# Patient Record
Sex: MALE | Race: WHITE
[De-identification: names, ages, dates, MRNs, and addresses within clinical notes are randomized per-mention and may not be internally consistent; named-entity substitution may affect disease eponyms.]

---

## 2020-10-27 ENCOUNTER — HOSPITAL ENCOUNTER (INPATIENT)
Dept: HOSPITAL 62 - ER | Age: 60
LOS: 6 days | Discharge: HOME | DRG: 179 | End: 2020-11-02
Attending: FAMILY MEDICINE | Admitting: INTERNAL MEDICINE
Payer: SELF-PAY

## 2020-10-27 DIAGNOSIS — F12.90: ICD-10-CM

## 2020-10-27 DIAGNOSIS — F17.210: ICD-10-CM

## 2020-10-27 DIAGNOSIS — F19.10: ICD-10-CM

## 2020-10-27 DIAGNOSIS — J85.0: Primary | ICD-10-CM

## 2020-10-27 DIAGNOSIS — F10.20: ICD-10-CM

## 2020-10-27 LAB
ADD MANUAL DIFF: NO
ALBUMIN SERPL-MCNC: 3.4 G/DL (ref 3.5–5)
ALP SERPL-CCNC: 92 U/L (ref 38–126)
ANION GAP SERPL CALC-SCNC: 13 MMOL/L (ref 5–19)
AST SERPL-CCNC: 48 U/L (ref 17–59)
BASOPHILS # BLD AUTO: 0.1 10^3/UL (ref 0–0.2)
BASOPHILS NFR BLD AUTO: 0.3 % (ref 0–2)
BILIRUB DIRECT SERPL-MCNC: 0.3 MG/DL (ref 0–0.4)
BILIRUB SERPL-MCNC: 0.8 MG/DL (ref 0.2–1.3)
BUN SERPL-MCNC: 21 MG/DL (ref 7–20)
CALCIUM: 9.4 MG/DL (ref 8.4–10.2)
CHLORIDE SERPL-SCNC: 93 MMOL/L (ref 98–107)
CO2 SERPL-SCNC: 28 MMOL/L (ref 22–30)
EOSINOPHIL # BLD AUTO: 0.1 10^3/UL (ref 0–0.6)
EOSINOPHIL NFR BLD AUTO: 0.4 % (ref 0–6)
ERYTHROCYTE [DISTWIDTH] IN BLOOD BY AUTOMATED COUNT: 13 % (ref 11.5–14)
ETHANOL SERPL-MCNC: < 10 MG/DL
GLUCOSE SERPL-MCNC: 100 MG/DL (ref 75–110)
HCT VFR BLD CALC: 40 % (ref 37.9–51)
HGB BLD-MCNC: 13.9 G/DL (ref 13.5–17)
LYMPHOCYTES # BLD AUTO: 1.7 10^3/UL (ref 0.5–4.7)
LYMPHOCYTES NFR BLD AUTO: 8.5 % (ref 13–45)
MCH RBC QN AUTO: 32 PG (ref 27–33.4)
MCHC RBC AUTO-ENTMCNC: 34.8 G/DL (ref 32–36)
MCV RBC AUTO: 92 FL (ref 80–97)
MONOCYTES # BLD AUTO: 1.1 10^3/UL (ref 0.1–1.4)
MONOCYTES NFR BLD AUTO: 5.4 % (ref 3–13)
NEUTROPHILS # BLD AUTO: 17 10^3/UL (ref 1.7–8.2)
NEUTS SEG NFR BLD AUTO: 85.4 % (ref 42–78)
PLATELET # BLD: 488 10^3/UL (ref 150–450)
POTASSIUM SERPL-SCNC: 4.9 MMOL/L (ref 3.6–5)
PROT SERPL-MCNC: 7.5 G/DL (ref 6.3–8.2)
RBC # BLD AUTO: 4.35 10^6/UL (ref 4.35–5.55)
TOTAL CELLS COUNTED % (AUTO): 100 %
WBC # BLD AUTO: 19.9 10^3/UL (ref 4–10.5)

## 2020-10-27 PROCEDURE — 80307 DRUG TEST PRSMV CHEM ANLYZR: CPT

## 2020-10-27 PROCEDURE — 85025 COMPLETE CBC W/AUTO DIFF WBC: CPT

## 2020-10-27 PROCEDURE — 83735 ASSAY OF MAGNESIUM: CPT

## 2020-10-27 PROCEDURE — 86480 TB TEST CELL IMMUN MEASURE: CPT

## 2020-10-27 PROCEDURE — 93005 ELECTROCARDIOGRAM TRACING: CPT

## 2020-10-27 PROCEDURE — 80061 LIPID PANEL: CPT

## 2020-10-27 PROCEDURE — 71260 CT THORAX DX C+: CPT

## 2020-10-27 PROCEDURE — 87205 SMEAR GRAM STAIN: CPT

## 2020-10-27 PROCEDURE — 87206 SMEAR FLUORESCENT/ACID STAI: CPT

## 2020-10-27 PROCEDURE — 87077 CULTURE AEROBIC IDENTIFY: CPT

## 2020-10-27 PROCEDURE — 80048 BASIC METABOLIC PNL TOTAL CA: CPT

## 2020-10-27 PROCEDURE — 80053 COMPREHEN METABOLIC PANEL: CPT

## 2020-10-27 PROCEDURE — 87040 BLOOD CULTURE FOR BACTERIA: CPT

## 2020-10-27 PROCEDURE — 87070 CULTURE OTHR SPECIMN AEROBIC: CPT

## 2020-10-27 PROCEDURE — 96365 THER/PROPH/DIAG IV INF INIT: CPT

## 2020-10-27 PROCEDURE — 71045 X-RAY EXAM CHEST 1 VIEW: CPT

## 2020-10-27 PROCEDURE — 86701 HIV-1ANTIBODY: CPT

## 2020-10-27 PROCEDURE — 99285 EMERGENCY DEPT VISIT HI MDM: CPT

## 2020-10-27 PROCEDURE — 87116 MYCOBACTERIA CULTURE: CPT

## 2020-10-27 PROCEDURE — 84100 ASSAY OF PHOSPHORUS: CPT

## 2020-10-27 PROCEDURE — 36415 COLL VENOUS BLD VENIPUNCTURE: CPT

## 2020-10-27 PROCEDURE — 87015 SPECIMEN INFECT AGNT CONCNTJ: CPT

## 2020-10-27 PROCEDURE — 93010 ELECTROCARDIOGRAM REPORT: CPT

## 2020-10-27 RX ADMIN — HEPARIN SODIUM SCH UNIT: 5000 INJECTION, SOLUTION INTRAVENOUS; SUBCUTANEOUS at 23:22

## 2020-10-27 RX ADMIN — Medication SCH MG: at 23:22

## 2020-10-27 RX ADMIN — NICOTINE SCH: 21 PATCH, EXTENDED RELEASE TOPICAL at 23:22

## 2020-10-27 RX ADMIN — ACETAMINOPHEN PRN MG: 325 TABLET ORAL at 21:36

## 2020-10-27 RX ADMIN — CEFEPIME SCH MLS/HR: 2 INJECTION, POWDER, FOR SOLUTION INTRAMUSCULAR; INTRAVENOUS at 23:23

## 2020-10-27 NOTE — RADIOLOGY REPORT (SQ)
EXAM DESCRIPTION:  CHEST SINGLE VIEW



IMAGES COMPLETED DATE/TIME:  10/27/2020 11:18 am



REASON FOR STUDY:  Dyspnea



COMPARISON:  None.



EXAM PARAMETERS:  NUMBER OF VIEWS: One view.

TECHNIQUE: Single frontal radiographic view of the chest acquired.

RADIATION DOSE: NA

LIMITATIONS: None.



FINDINGS:  LUNGS AND PLEURA: Dense consolidation in the right upper lobe.  Otherwise clear.  No pleur
al effusion.  No pneumothorax.

MEDIASTINUM AND HILAR STRUCTURES: No masses.  Contour normal.

HEART AND VASCULAR STRUCTURES: Heart normal in size.  Normal vasculature.

BONES: No acute findings.  Degenerative changes in the spine.

HARDWARE: None in the chest.

OTHER: No other significant finding.



IMPRESSION:  DENSE CONSOLIDATION IN THE RIGHT UPPER LOBE LIKELY DUE TO PNEUMONIA.  UNDERLYING MASS CA
NNOT BE EXCLUDED.



TECHNICAL DOCUMENTATION:  JOB ID:  [id number]

 2011 [employer]- All Rights Reserved



Reading location - IP/workstation name: [email or web address]

## 2020-10-27 NOTE — ER DOCUMENT REPORT
ED General





- General


Chief Complaint: Flu Symptoms


Stated Complaint: BACK PAIN/SHORTNESS OF BREATH


Time Seen by Provider: 10/27/20 10:06





- HPI


Notes: 





Chief complaint: Back pain cough





History of present illness: 60-year-old male 1 pack/day cigarette smoker for 

many years presents now with 1 month history of progressively worsening back 

pain seated with 15 pound weight loss and cough productive of some sputum.  He 

denies hemoptysis.  He denies any prior history of respiratory problems.  He 

specifically denies any known history of TB or exposure to TB that he is aware 

of.  Has not seen a doctor in many years and is not currently taking any 

medications.  He has no known allergies.  Patient says he drinks 4 to 6 beers 

per day.  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

6 months ago due to chronic musculoskeletal pain.  Currently lives alone.





- Related Data


Allergies/Adverse Reactions: 


                                        





No Known Allergies Allergy (Verified 10/27/20 10:51)


   











Past Medical History





- General


Information source: Patient





- Social History


Smoking Status: Current Every Day Smoker


Family History: Reviewed & Not Pertinent





- Past Medical History


Cardiac Medical History: Reports: Hx Hypertension


Surgical Hx: Negative





Review of Systems





- Review of Systems


Notes: 





Constitutional: Negative for fever.  Weight loss as per HPI.


HENT: Negative for sore throat.


Eyes: Negative for visual changes.


Cardiovascular: Negative for chest pain.


Respiratory: As per HPI.


Gastrointestinal: Negative for abdominal pain, vomiting or diarrhea.


Genitourinary: Negative for dysuria.


Musculoskeletal: As per HPI.


Skin: Negative for rash.


Neurological: Negative for headaches, weakness or numbness.





10 point ROS negative except as marked above and in HPI.








Physical Exam





- Vital signs


Vitals: 


                                        











Temp Pulse Resp BP Pulse Ox


 


 98.1 F   119 H  18   128/80 H  100 


 


 10/27/20 09:21  10/27/20 09:21  10/27/20 09:21  10/27/20 09:21  10/27/20 09:21














Course





- Re-evaluation


Re-evalutation: 





10/27/20 15:12


Patient has what appears to be a dense pneumonic infiltrate versus mass on plain

film of the chest.  We obtained a CT of the chest with IV contrast and this 

suggests a necrotizing pneumonia with central cavitation.  20,000 white count 

present.  Patient is mildly dyspneic at rest but has normal O2 saturation by 

pulse oximeter at this time.  Blood cultures have been drawn.  Patient has 

received IV Rocephin and azithromycin.  We spoke with Dr. Collier from pulmonary 

who agrees to see the patient for consultation possible bronchoscopy if patient 

can be admitted by the hospitalist service.





Patient has been accepted for admission by hospitalist service.





Findings, clinical impression and plan of treatment have been discussed with 

patient/family.  Understanding of current findings and recommendations has been 

acknowledged by them and there is agreement regarding disposition and follow-up.





- Vital Signs


Vital signs: 


                                        











Temp Pulse Resp BP Pulse Ox


 


 98.1 F   119 H  33 H  141/95 H  84 L


 


 10/27/20 09:21  10/27/20 09:21  10/27/20 14:06  10/27/20 13:03  10/27/20 14:06














- Laboratory


Result Diagrams: 


                                 10/27/20 12:42





                                 10/27/20 12:42


Laboratory results interpreted by me: 


                                        











  10/27/20 10/27/20





  12:42 12:42


 


WBC  19.9 H 


 


Plt Count  488 H 


 


Lymph % (Auto)  8.5 L 


 


Absolute Neuts (auto)  17.0 H 


 


Seg Neutrophils %  85.4 H 


 


Sodium   133.8 L


 


Chloride   93 L


 


BUN   21 H


 


Albumin   3.4 L














- EKG Interpretation by Me


Additional EKG results interpreted by me: 





10/27/20 11:17


Twelve-lead EKG reviewed by me contemporaneously: 1000 hours


Indication for study: Back pain


Rhythm: Normal sinus


Rate: 96


Intervals: Normal


QRS axis: +54 degrees


ST/T wave changes: None


Right atrial abnormality present


Comparison with prior tracing: None





Interpretation: Right atrial abnormality





Discharge





- Discharge


Clinical Impression: 


 Necrotizing pneumonia right upper lobe





Condition: Good


Disposition: ADMITTED AS INPATIENT


Admitting Provider: Ban (Hospitalist)


Unit Admitted: Medical Floor

## 2020-10-27 NOTE — PDOC H&P
History of Present Illness


Admission Date/PCP: 


  10/27/20 16:28





  





History of Present Illness: 


SALVADOR FREGOSO III is a 60 year old male with no significant past medical 

history who presents with a 1 week history of progressive chills right-sided 

chest pain shortness of breath.  Patient also states he has been losing weight 

with approximately 15 to 20 pounds lost in the past month.  Patient states is 

never had this sensation before.  He believes it is related to inhaling 

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  He states multiple 

other workers were also ill from this.  He is a current smoker and drinks 4-6 

beers per day last drink was yesterday.  He states he is never gone through 

withdrawal.  He also smokes marijuana intermittently.  He denies any fevers or 

productive cough.  He denies any COVID-19 exposures and denies any history of 

tuberculosis.  He denies any history of GERD or aspiration.  Pulmonology was 

consulted on admission for possible bronchoscopy.  CT scan chest showed 

necrotizing pneumonia in right upper lobe.








Past Medical History


Cardiac Medical History: Reports: Hypertension


EENT Medical History: Reports: Cataracts


Psychiatric Medical History: Reports: Alcohol Dependency, Substance Abuse, 

Tobacco Dependency





Past Surgical History


Past Surgical History: Reports: Other - Cataract





Social History


Information Source: Patient, Emergency Med Personnel


Lives with: Alone


Smoking Status: Current Every Day Smoker


Frequency of Alcohol Use: Heavy


Hx Recreational Drug Use: Yes


Drugs: Marijuana


Hx Prescription Drug Abuse: No





- Advance Directive


Resuscitation Status: Full Code


Surrogate healthcare decision maker:: 


Admitting diagnosis: Necrotizing pneumonia





All aspects of code status discussed with patient/POA including cardioversion, 

chest compressions, and intubation and the patient/POA indicated they wish to be

full code





MPOA is designated as: Anna Marie Meyer





Time spent: Greater than 16 minutes








Family History


Family History: Reviewed & Not Pertinent


Parental Family History Reviewed: Yes


Children Family History Reviewed: Yes


Sibling(s) Family History Reviewed.: Yes





Medication/Allergy


Home Medications: 








No Home Medications  10/27/20 








Allergies/Adverse Reactions: 


                                        





No Known Allergies Allergy (Verified 10/27/20 10:51)


   











Review of Systems


All systems: reviewed and no additional remarkable complaints except as stated -

Review of systems per HPI otherwise negative





Physical Exam


Vital Signs: 


                                        











Temp Pulse Resp BP Pulse Ox


 


 99.8 F   100   17   147/102 H  98 


 


 10/27/20 18:31  10/27/20 18:31  10/27/20 18:31  10/27/20 18:31  10/27/20 18:31








                                 Intake & Output











 10/26/20 10/27/20 10/28/20





 06:59 06:59 06:59


 


Weight   54.2 kg











Exam: 





General appearance: PRESENT: no acute distress, thin


Head exam: PRESENT: atraumatic, normocephalic


Eye exam: PRESENT: conjunctiva pink.  ABSENT: scleral icterus


Mouth exam: PRESENT: moist


Respiratory exam: PRESENT: Faint rales right upper lobe ABSENT: rhonchi, wheezes


Cardiovascular exam: PRESENT: RRR.  ABSENT: diastolic murmur, rubs, systolic 

murmur


GI/Abdominal exam: PRESENT: normal bowel sounds, soft.  ABSENT: distended, 

guarding, mass, organolmegaly, rebound, tenderness


Neurological exam: PRESENT: alert, awake, oriented to person, oriented to place,

oriented to time, oriented to situation


Psychiatric exam: PRESENT: appropriate affect, normal mood


Skin exam: PRESENT: dry, intact, warm





Results


Laboratory Results: 


                                        





                                 10/27/20 12:42 





                                 10/27/20 12:42 





                                        











  10/27/20 10/27/20





  12:42 12:42


 


WBC  19.9 H 


 


RBC  4.35 


 


Hgb  13.9 


 


Hct  40.0 


 


MCV  92 


 


MCH  32.0 


 


MCHC  34.8 


 


RDW  13.0 


 


Plt Count  488 H 


 


Seg Neutrophils %  85.4 H 


 


Sodium   133.8 L


 


Potassium   4.9


 


Chloride   93 L


 


Carbon Dioxide   28


 


Anion Gap   13


 


BUN   21 H


 


Creatinine   0.65


 


Est GFR (African Amer)   > 60


 


Glucose   100


 


Calcium   9.4


 


Total Bilirubin   0.8


 


AST   48


 


Alkaline Phosphatase   92


 


Total Protein   7.5


 


Albumin   3.4 L











Impressions: 


                                        





Chest X-Ray  10/27/20 10:44


IMPRESSION:  DENSE CONSOLIDATION IN THE RIGHT UPPER LOBE LIKELY DUE TO 

PNEUMONIA.  UNDERLYING MASS CANNOT BE EXCLUDED.


 








Chest CT  10/27/20 11:12


IMPRESSION:  Necrotizing right upper lobe pneumonia with central cavity and 

fluid


Probable 1 cm scar in the right lower lobe adjacent to old healed rib fractures


 














Assessment and Plan





- Diagnosis


(1) Necrotizing pneumonia


Is this a current diagnosis for this admission?: Yes   


Plan: 





Approximately 1 week of progressive right-sided chest pain and chills, patient 

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


Pulmonology consulted for possible bronchoscopy


Cefepime/azithromycin for broad coverage


Rule out TB: QuantiFERON gold and AF[employer] x3


Sputum culture


Blood culture


Duo nebs as needed


Pulmonary hygiene


HIV ordered








(2) Cavitary lesion of lung


Is this a current diagnosis for this admission?: Yes   


Plan: 





Unclear etiology


Work-up as above








(3) Tobacco abuse


Is this a current diagnosis for this admission?: Yes   


Plan: 





Counseled on cessation


Nicotine patch








(4) Alcohol abuse


Is this a current diagnosis for this admission?: Yes   


Plan: 





Counseled on cessation


CIWA


As needed Ativan


Thiamine and vitamins








(5) Polysubstance abuse


Is this a current diagnosis for this admission?: Yes   


Plan: 





Counseled on cessation of alcohol, marijuana, tobacco


UDS








- Time


Time Spent with patient: 35 or more minutes


Smoking Cessation Education: 3 to 10 minutes


Medications reviewed and adjusted accordingly: Yes


Anticipated Discharge Disposition: Home, Self Care


Anticipated Discharge Timeframe: within 72 hours





- Inpatient Certification


Based on my medical assessment, after consideration of the patient's 

comorbidities, presenting symptoms, or acuity I expect that the services needed 

warrant INPATIENT care.: Yes


I certify that my determination is in accordance with my understanding of 

Medicare's requirements for reasonable and necessary INPATIENT services [42 CFR 

412.3e].: Yes


Medical Necessity: Significant Comorbidiites Make Outpatient Treatment Too 

Risky, Need Close Monitoring Due to Risk of Patient Decompensation, Need for IV 

Antibiotics, Risk of Complication if Not Cared For in Hospital, Risk of Diag

nosis Which Will Require Inpatient Eval/Care/Monitoring

## 2020-10-27 NOTE — RADIOLOGY REPORT (SQ)
EXAM DESCRIPTION:  CT CHEST WITH



IMAGES COMPLETED DATE/TIME:  10/27/2020 2:07 pm



REASON FOR STUDY:  Right upper lobe mass



COMPARISON:  AP chest 10/27/2020



TECHNIQUE:  CT scan of the chest performed using helical scanning technique with dynamic intravenous 
contrast injection.  Images reviewed with lung, soft tissue and bone windows.  Reconstructed coronal 
and sagittal MPR and MIP images reviewed.  All images stored on PACS.

All CT scanners at this facility use dose modulation, iterative reconstruction, and/or weight based d
osing when appropriate to reduce radiation dose to as low as reasonably achievable (ALARA).

[facility]: Dose Right  [facility]: CareDose    [facility]: Dose Right    [facility]: Teradose 4D    [facility]: Smart Technologies



CONTRAST TYPE AND DOSE:  contrast/concentration: Isovue 350.00 mmol/ml; Total Contrast Delivered: 60.
0 ml; Total Saline Delivered: 55.0 ml



RENAL FUNCTION:  Creatinine 0.7



RADIATION DOSE:  CT Rad equipment meets quality standard of care and radiation dose reduction techniq
ues were employed. CTDIvol: 5.3 mGy. DLP: 219 mGy-cm. .



LIMITATIONS:  None.



FINDINGS:  LUNGS AND PLEURA: Dense consolidation is present in the right upper lobe.  Central cavitat
ion of the infiltrate is present, best shown on axial image 18.  Necrotizing pneumonia is suspected.

On axial image 40, a 10 mm irregularly-shaped pleural-based nodule is present in the right lower lobe
 adjacent to old healed rib fractures.

HILAR AND MEDIASTINAL STRUCTURES: 1.5 x 0.9 cm precarinal lymph node.

HEART AND VASCULAR STRUCTURES: No thoracic aortic aneurysm or dissection.  No central pulmonary embol
i.  No pericardial effusion or cardiomegaly.  Very dense coronary arteries, question left-sided coron
alley stents.

HARDWARE: None in the chest.

UPPER ABDOMEN: No significant findings.  Limited exam.

THYROID AND OTHER SOFT TISSUES: No masses.  No adenopathy.

BONES: No significant finding.

OTHER: No other significant finding.



IMPRESSION:  Necrotizing right upper lobe pneumonia with central cavity and fluid

Probable 1 cm scar in the right lower lobe adjacent to old healed rib fractures



TECHNICAL DOCUMENTATION:  JOB ID:  [id number]

Quality ID # [id number]: Final reports with documentation of one or more dose reduction techniques (e.g., Au
tomated exposure control, adjustment of the mA and/or kV according to patient size, use of iterative 
reconstruction technique)

 2011 [employer]- All Rights Reserved



Reading location - IP/workstation name: [email or web address]

## 2020-10-28 LAB
ADD MANUAL DIFF: NO
ANION GAP SERPL CALC-SCNC: 15 MMOL/L (ref 5–19)
BASOPHILS # BLD AUTO: 0.1 10^3/UL (ref 0–0.2)
BASOPHILS NFR BLD AUTO: 0.3 % (ref 0–2)
BUN SERPL-MCNC: 14 MG/DL (ref 7–20)
CALCIUM: 8.8 MG/DL (ref 8.4–10.2)
CHLORIDE SERPL-SCNC: 94 MMOL/L (ref 98–107)
CHOLEST SERPL-MCNC: 97.18 MG/DL (ref 0–200)
CO2 SERPL-SCNC: 20 MMOL/L (ref 22–30)
EOSINOPHIL # BLD AUTO: 0 10^3/UL (ref 0–0.6)
EOSINOPHIL NFR BLD AUTO: 0.2 % (ref 0–6)
ERYTHROCYTE [DISTWIDTH] IN BLOOD BY AUTOMATED COUNT: 12.7 % (ref 11.5–14)
GLUCOSE SERPL-MCNC: 102 MG/DL (ref 75–110)
HCT VFR BLD CALC: 35.5 % (ref 37.9–51)
HGB BLD-MCNC: 12.6 G/DL (ref 13.5–17)
LDLC SERPL DIRECT ASSAY-MCNC: 40 MG/DL (ref ?–100)
LYMPHOCYTES # BLD AUTO: 1.2 10^3/UL (ref 0.5–4.7)
LYMPHOCYTES NFR BLD AUTO: 7.2 % (ref 13–45)
MCH RBC QN AUTO: 32.3 PG (ref 27–33.4)
MCHC RBC AUTO-ENTMCNC: 35.5 G/DL (ref 32–36)
MCV RBC AUTO: 91 FL (ref 80–97)
MONOCYTES # BLD AUTO: 1.1 10^3/UL (ref 0.1–1.4)
MONOCYTES NFR BLD AUTO: 6.3 % (ref 3–13)
NEUTROPHILS # BLD AUTO: 14.6 10^3/UL (ref 1.7–8.2)
NEUTS SEG NFR BLD AUTO: 86 % (ref 42–78)
PHOSPHATE SERPL-MCNC: 3.4 MG/DL (ref 2.5–4.5)
PLATELET # BLD: 424 10^3/UL (ref 150–450)
POTASSIUM SERPL-SCNC: 4.2 MMOL/L (ref 3.6–5)
RBC # BLD AUTO: 3.9 10^6/UL (ref 4.35–5.55)
TOTAL CELLS COUNTED % (AUTO): 100 %
TRIGL SERPL-MCNC: 156 MG/DL (ref ?–150)
VLDLC SERPL CALC-MCNC: 31.2 MG/DL (ref 10–31)
WBC # BLD AUTO: 17 10^3/UL (ref 4–10.5)

## 2020-10-28 RX ADMIN — HEPARIN SODIUM SCH UNIT: 5000 INJECTION, SOLUTION INTRAVENOUS; SUBCUTANEOUS at 21:03

## 2020-10-28 RX ADMIN — MULTIVITAMIN TABLET SCH TAB: TABLET at 09:42

## 2020-10-28 RX ADMIN — Medication SCH MG: at 09:42

## 2020-10-28 RX ADMIN — CLINDAMYCIN PHOSPHATE SCH MLS/HR: 12 INJECTION, SOLUTION INTRAVENOUS at 16:07

## 2020-10-28 RX ADMIN — HEPARIN SODIUM SCH UNIT: 5000 INJECTION, SOLUTION INTRAVENOUS; SUBCUTANEOUS at 13:29

## 2020-10-28 RX ADMIN — CEFEPIME SCH MLS/HR: 2 INJECTION, POWDER, FOR SOLUTION INTRAMUSCULAR; INTRAVENOUS at 21:04

## 2020-10-28 RX ADMIN — IPRATROPIUM BROMIDE AND ALBUTEROL SULFATE PRN ML: 2.5; .5 SOLUTION RESPIRATORY (INHALATION) at 10:51

## 2020-10-28 RX ADMIN — NICOTINE SCH: 21 PATCH, EXTENDED RELEASE TOPICAL at 09:52

## 2020-10-28 RX ADMIN — PANTOPRAZOLE SODIUM SCH MG: 40 TABLET, DELAYED RELEASE ORAL at 05:29

## 2020-10-28 RX ADMIN — ACETAMINOPHEN PRN MG: 325 TABLET ORAL at 21:04

## 2020-10-28 RX ADMIN — DOCUSATE SODIUM SCH MG: 100 CAPSULE, LIQUID FILLED ORAL at 09:42

## 2020-10-28 RX ADMIN — ACETAMINOPHEN PRN MG: 325 TABLET ORAL at 13:27

## 2020-10-28 RX ADMIN — CEFEPIME SCH MLS/HR: 2 INJECTION, POWDER, FOR SOLUTION INTRAMUSCULAR; INTRAVENOUS at 09:42

## 2020-10-28 RX ADMIN — CLINDAMYCIN PHOSPHATE SCH MLS/HR: 12 INJECTION, SOLUTION INTRAVENOUS at 21:04

## 2020-10-28 RX ADMIN — AZITHROMYCIN MONOHYDRATE SCH MLS/HR: 500 INJECTION, POWDER, LYOPHILIZED, FOR SOLUTION INTRAVENOUS at 13:27

## 2020-10-28 RX ADMIN — HEPARIN SODIUM SCH UNIT: 5000 INJECTION, SOLUTION INTRAVENOUS; SUBCUTANEOUS at 05:29

## 2020-10-28 NOTE — PDOC PROGRESS REPORT
Subjective


Subjective:: 





SALVADOR FREGOSO III is a 60 year old male with no significant past medical 

history who presents with a 1 week history of progressive chills right-sided 

chest pain shortness of breath.  Patient also states he has been losing weight 

with approximately 15 to 20 pounds lost in the past month.  Patient states is 

never had this sensation before.  He believes it is related to inhaling 

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  He states multiple 

other workers were also ill from this.  He is a current smoker and drinks 4-6 

beers per day last drink was yesterday.  He states he is never gone through 

withdrawal.  He also smokes marijuana intermittently.  He denies any fevers or 

productive cough.  He denies any COVID-19 exposures and denies any history of 

tuberculosis.  He denies any history of GERD or aspiration.  Pulmonology was 

consulted on admission for possible bronchoscopy.  CT scan chest showed nec

rotizing pneumonia in right upper lobe.





10/28/2020


Patient seems to be improved from yesterday and voices that he feels better.  I 

discussed the case with Dr. Bruton and pulmonology today and he stated he would 

like the patient to take antibiotics for a few days and then reassess for 

bronchoscopy needs.  Patient in agreement with this plan.  White blood cells 

lower.  Patient is in a negative pressure room while we rule out TB given he has

a cavitary lesion on CT scan.  Patient denies ever having pneumonia in the past.

 HIV is negative.


Reason For Visit: 


NECROTIZING PNEUMONIA








Physical Exam


Vital Signs: 


                                        











Temp Pulse Resp BP Pulse Ox


 


 99.9 F   97   16   138/85 H  97 


 


 10/28/20 08:50  10/28/20 10:51  10/28/20 10:51  10/28/20 08:29  10/28/20 10:51








                                 Intake & Output











 10/27/20 10/28/20 10/29/20





 06:59 06:59 06:59


 


Intake Total  660 


 


Balance  660 


 


Weight  54.2 kg 











Exam: 





General appearance: PRESENT: no acute distress, thin, seems better today


Head exam: PRESENT: atraumatic, normocephalic


Eye exam: PRESENT: conjunctiva pink.  ABSENT: scleral icterus


Mouth exam: PRESENT: moist


Respiratory exam: PRESENT: Scant rales right upper lobe ABSENT: rhonchi, wheezes


Cardiovascular exam: PRESENT: RRR.  ABSENT: diastolic murmur, rubs, systolic 

murmur


GI/Abdominal exam: PRESENT: normal bowel sounds, soft.  ABSENT: distended, 

guarding, mass, organolmegaly, rebound, tenderness


Neurological exam: PRESENT: alert, awake, oriented to person, oriented to place,

oriented to time, oriented to situation


Psychiatric exam: PRESENT: appropriate affect, normal mood


Skin exam: PRESENT: dry, intact, warm





Results


Laboratory Results: 


                                        





                                 10/28/20 07:06 





                                 10/28/20 07:06 





                                        











  10/28/20 10/28/20





  07:06 07:06


 


WBC   17.0 H


 


RBC   3.90 L


 


Hgb   12.6 L


 


Hct   35.5 L


 


MCV   91


 


MCH   32.3


 


MCHC   35.5


 


RDW   12.7


 


Plt Count   424


 


Seg Neutrophils %   86.0 H


 


Sodium  128.5 L 


 


Potassium  4.2 


 


Chloride  94 L 


 


Carbon Dioxide  20 L 


 


Anion Gap  15 


 


BUN  14 


 


Creatinine  0.57 


 


Est GFR (African Amer)  > 60 


 


Glucose  102 


 


Calcium  8.8 


 


Phosphorus  3.4 


 


Magnesium  1.9 


 


Triglycerides  156 H 


 


Cholesterol  97.18 


 


LDL Cholesterol Direct  40 


 


VLDL Cholesterol  31.2 H 


 


HDL Cholesterol  21 L 











Impressions: 


                                        





Chest X-Ray  10/27/20 10:44


IMPRESSION:  DENSE CONSOLIDATION IN THE RIGHT UPPER LOBE LIKELY DUE TO 

PNEUMONIA.  UNDERLYING MASS CANNOT BE EXCLUDED.


 








Chest CT  10/27/20 11:12


IMPRESSION:  Necrotizing right upper lobe pneumonia with central cavity and 

fluid


Probable 1 cm scar in the right lower lobe adjacent to old healed rib fractures


 














Assessment and Plan





- Diagnosis


(1) Necrotizing pneumonia


Is this a current diagnosis for this admission?: Yes   


Plan: 





Approximately 1 week of progressive right-sided chest pain and chills, patient 

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


Pulmonology consulted: Discussed with Dr. Bruton, after being given a few days 

of antibiotics, can reassess for possible bronchoscopy


Cefepime/azithromycin for broad coverage


Rule out TB: QuantiFERON gold and AFB x3


Sputum culture


Blood culture


Duo nebs as needed


Pulmonary hygiene


HIV negative








(2) Cavitary lesion of lung


Is this a current diagnosis for this admission?: Yes   


Plan: 





Unclear etiology


Work-up as above








(3) Tobacco abuse


Is this a current diagnosis for this admission?: Yes   





(4) Alcohol abuse


Is this a current diagnosis for this admission?: Yes   


Plan: 





Counseled on cessation


CIWA


As needed Ativan


Thiamine and vitamins








(5) Polysubstance abuse


Is this a current diagnosis for this admission?: Yes   





- Time


Time Spent with patient: 25-34 minutes


Medications reviewed and adjusted accordingly: Yes


Anticipated Discharge Disposition: Home, Self Care


Anticipated Discharge Timeframe: within 72 hours





- Inpatient Certification


Based on my medical assessment, after consideration of the patient's 

comorbidities, presenting symptoms, or acuity I expect that the services needed 

warrant INPATIENT care.: Yes


I certify that my determination is in accordance with my understanding of 

Medicare's requirements for reasonable and necessary INPATIENT services [42 CFR 

412.3e].: Yes


Medical Necessity: Significant Comorbidiites Make Outpatient Treatment Too 

Risky, Need Close Monitoring Due to Risk of Patient Decompensation, Need for IV 

Antibiotics, Risk of Complication if Not Cared For in Hospital, Risk of 

Diagnosis Which Will Require Inpatient Eval/Care/Monitoring

## 2020-10-28 NOTE — PDOC CONSULTATION
Consultation


Consult Date: 10/28/20


Provider Consulted: HENRY DAVID BRUTON


Consult reason:: Right upper lobe abnormality on CAT scan.





History of Present Illness


Admission Date/PCP: 


  10/27/20 16:28





  





History of Present Illness: 


SALVADOR FREGOSO III is a 60 year old male


This patient presents with approximately 6-week long history of weight loss, 

cough, dyspnea, chills and night sweats.  He states that beginning approximately

6 to 8 weeks ago he began remodeling his house.  This apparently involved 

getting into areas of his ceiling and attic he had not previously been into in a

number of years.  It apparently was a fairly luann environment.  Subsequently 

both him and 2 of his friends who helped him became ill with various respiratory

complaints.  He ultimately presented to the emergency room with the above 

history and was found to have an abnormal chest x-ray and subsequent CT scan of 

the chest as well.





This patient has a significant history of cigarette smoking smoking as much as 1

to 1-1/2 packs/day.  He continued to smoke 1 week prior to admission.  He has 

been smoking for approximately 40 years.  He notes exposure to a number of dusts

in the past but no discrete occupational exposures.  There is no significant 

family history of pulmonary disease.





Past Medical History


Cardiac Medical History: Reports: Hypertension


EENT Medical History: Reports: Cataracts


Psychiatric Medical History: Reports: Alcohol Dependency, Substance Abuse, 

Tobacco Dependency


   Denies: Depression





Past Surgical History


Past Surgical History: Reports: Other - Cataract





Social History


Lives with: Alone


Smoking Status: Current Every Day Smoker


Cigarettes Packs Per Day: 1


Cigars Per Day: 0


Pipes Per Day: 0


Number of Years Smokin


Last Time Smoked: 10/22/2020


Frequency of Alcohol Use: Heavy


Hx Recreational Drug Use: Yes


Drugs: Marijuana


Hx Prescription Drug Abuse: No





- Advance Directive


Resuscitation Status: Full Code





Family History


Family History: Reviewed & Not Pertinent


Parental Family History Reviewed: No


Children Family History Reviewed: No


Sibling(s) Family History Reviewed.: No





Medication/Allergy


Home Medications: 








No Home Medications  10/27/20 








Allergies/Adverse Reactions: 


                                        





No Known Allergies Allergy (Verified 10/27/20 10:51)


   











Physical Exam


Vital Signs: 


                                        











Temp Pulse Resp BP Pulse Ox


 


 99.9 F   111 H  20   138/85 H  93 


 


 10/28/20 08:50  10/28/20 08:29  10/28/20 08:29  10/28/20 08:29  10/28/20 08:29








                                 Intake & Output











 10/27/20 10/28/20 10/29/20





 06:59 06:59 06:59


 


Intake Total  660 


 


Balance  660 


 


Weight  54.2 kg 














Results


Laboratory Results: 


                                        





                                 10/28/20 07:06 





                                 10/28/20 07:06 





                                        











  10/27/20 10/27/20 10/28/20





  12:42 12:42 07:06


 


WBC  19.9 H  


 


RBC  4.35  


 


Hgb  13.9  


 


Hct  40.0  


 


MCV  92  


 


MCH  32.0  


 


MCHC  34.8  


 


RDW  13.0  


 


Plt Count  488 H  


 


Seg Neutrophils %  85.4 H  


 


Sodium   133.8 L  128.5 L


 


Potassium   4.9  4.2


 


Chloride   93 L  94 L


 


Carbon Dioxide   28  20 L


 


Anion Gap   13  15


 


BUN   21 H  14


 


Creatinine   0.65  0.57


 


Est GFR ( Amer)   > 60  > 60


 


Glucose   100  102


 


Calcium   9.4  8.8


 


Phosphorus    3.4


 


Magnesium    1.9


 


Total Bilirubin   0.8 


 


AST   48 


 


Alkaline Phosphatase   92 


 


Total Protein   7.5 


 


Albumin   3.4 L 


 


Triglycerides    156 H


 


Cholesterol    97.18


 


LDL Cholesterol Direct    40


 


VLDL Cholesterol    31.2 H


 


HDL Cholesterol    21 L














  10/28/20





  07:06


 


WBC  17.0 H


 


RBC  3.90 L


 


Hgb  12.6 L


 


Hct  35.5 L


 


MCV  91


 


MCH  32.3


 


MCHC  35.5


 


RDW  12.7


 


Plt Count  424


 


Seg Neutrophils %  86.0 H


 


Sodium 


 


Potassium 


 


Chloride 


 


Carbon Dioxide 


 


Anion Gap 


 


BUN 


 


Creatinine 


 


Est GFR (African Amer) 


 


Glucose 


 


Calcium 


 


Phosphorus 


 


Magnesium 


 


Total Bilirubin 


 


AST 


 


Alkaline Phosphatase 


 


Total Protein 


 


Albumin 


 


Triglycerides 


 


Cholesterol 


 


LDL Cholesterol Direct 


 


VLDL Cholesterol 


 


HDL Cholesterol 











Impressions: 


                                        





Chest X-Ray  10/27/20 10:44


IMPRESSION:  DENSE CONSOLIDATION IN THE RIGHT UPPER LOBE LIKELY DUE TO 

PNEUMONIA.  UNDERLYING MASS CANNOT BE EXCLUDED.


 








Chest CT  10/27/20 11:12


IMPRESSION:  Necrotizing right upper lobe pneumonia with central cavity and 

fluid


Probable 1 cm scar in the right lower lobe adjacent to old healed rib fractures


 











Status: Image reviewed by me - I reviewed his hospitalization chest x-ray and CT

scan.  He indeed does have a dense infiltrative/masslike lesion involving the 

right upper lobe.  There does appear to be a small area of central cavitation.  

There is not appear to be any obvious air-fluid levels.





Assessment & Plan





- Diagnosis


(1) Cavitary lesion of lung


Is this a current diagnosis for this admission?: Yes   





- Plan Summary


Plan Summary: 





This patient presents with a 6-week history of cough, weight loss, chills and 

night sweats, and an abnormal CT scan of the chest.  While is entirely possible 

that this all represents an evolving anaerobic lung infection from an earlier 

pneumonia, it is certainly possible this represents a more atypical situation.  

In particular because of the upper lobe nature of this there is always concern 

about acid-fast bacilli.  This could be both typical and atypical mycobacterial 

infection.  In addition this could be nocardia actinomyces or other fungal 

infection.  Likewise this could all likely be a large necrotizing carcinoma.  Or

could represent a small endobronchial tumor with postobstructive inflammation 

and early cavitary lung formation.  However it seems likely that infection and 

likely bacterial infection is at least playing some role in this patient's 

presentation.  Therefore I think it is most appropriate to proceed with a course

of intravenous antibiotics to see how much of this right upper lobe infiltrative

process will resolve.  I have added clindamycin to his current antibiotic 

regimen.  We can certainly continue treatment and follow-up as an outpatient 

once his acute illness has resolved.  For now I would follow his temperature 

trend, his white cell count, his response to therapy, and ultimately serial 

chest x-rays and CT scans of the chest.  We will likely proceed with 

bronchoscopy at some point if this dense infiltrative possibly cavitary lesion 

does not completely resolve over time.

## 2020-10-29 LAB
ADD MANUAL DIFF: NO
ANION GAP SERPL CALC-SCNC: 12 MMOL/L (ref 5–19)
BASOPHILS # BLD AUTO: 0.1 10^3/UL (ref 0–0.2)
BASOPHILS NFR BLD AUTO: 0.3 % (ref 0–2)
BUN SERPL-MCNC: 14 MG/DL (ref 7–20)
CALCIUM: 8.8 MG/DL (ref 8.4–10.2)
CHLORIDE SERPL-SCNC: 95 MMOL/L (ref 98–107)
CO2 SERPL-SCNC: 22 MMOL/L (ref 22–30)
EOSINOPHIL # BLD AUTO: 0.1 10^3/UL (ref 0–0.6)
EOSINOPHIL NFR BLD AUTO: 0.7 % (ref 0–6)
ERYTHROCYTE [DISTWIDTH] IN BLOOD BY AUTOMATED COUNT: 12.6 % (ref 11.5–14)
GLUCOSE SERPL-MCNC: 92 MG/DL (ref 75–110)
HCT VFR BLD CALC: 35.1 % (ref 37.9–51)
HGB BLD-MCNC: 12.3 G/DL (ref 13.5–17)
LYMPHOCYTES # BLD AUTO: 1.5 10^3/UL (ref 0.5–4.7)
LYMPHOCYTES NFR BLD AUTO: 9.9 % (ref 13–45)
MCH RBC QN AUTO: 31.9 PG (ref 27–33.4)
MCHC RBC AUTO-ENTMCNC: 35 G/DL (ref 32–36)
MCV RBC AUTO: 91 FL (ref 80–97)
MONOCYTES # BLD AUTO: 0.9 10^3/UL (ref 0.1–1.4)
MONOCYTES NFR BLD AUTO: 5.9 % (ref 3–13)
NEUTROPHILS # BLD AUTO: 12.6 10^3/UL (ref 1.7–8.2)
NEUTS SEG NFR BLD AUTO: 83.2 % (ref 42–78)
PLATELET # BLD: 423 10^3/UL (ref 150–450)
POTASSIUM SERPL-SCNC: 4.5 MMOL/L (ref 3.6–5)
RBC # BLD AUTO: 3.85 10^6/UL (ref 4.35–5.55)
TOTAL CELLS COUNTED % (AUTO): 100 %
WBC # BLD AUTO: 15.1 10^3/UL (ref 4–10.5)

## 2020-10-29 RX ADMIN — CLINDAMYCIN PHOSPHATE SCH MLS/HR: 12 INJECTION, SOLUTION INTRAVENOUS at 13:52

## 2020-10-29 RX ADMIN — ACETAMINOPHEN PRN MG: 325 TABLET ORAL at 18:30

## 2020-10-29 RX ADMIN — CEFEPIME SCH MLS/HR: 2 INJECTION, POWDER, FOR SOLUTION INTRAMUSCULAR; INTRAVENOUS at 22:36

## 2020-10-29 RX ADMIN — AZITHROMYCIN MONOHYDRATE SCH MLS/HR: 500 INJECTION, POWDER, LYOPHILIZED, FOR SOLUTION INTRAVENOUS at 10:19

## 2020-10-29 RX ADMIN — CLINDAMYCIN PHOSPHATE SCH MLS/HR: 12 INJECTION, SOLUTION INTRAVENOUS at 06:18

## 2020-10-29 RX ADMIN — CEFEPIME SCH MLS/HR: 2 INJECTION, POWDER, FOR SOLUTION INTRAMUSCULAR; INTRAVENOUS at 09:51

## 2020-10-29 RX ADMIN — HEPARIN SODIUM SCH UNIT: 5000 INJECTION, SOLUTION INTRAVENOUS; SUBCUTANEOUS at 06:18

## 2020-10-29 RX ADMIN — ACETAMINOPHEN PRN MG: 325 TABLET ORAL at 22:38

## 2020-10-29 RX ADMIN — ACETAMINOPHEN PRN MG: 325 TABLET ORAL at 06:23

## 2020-10-29 RX ADMIN — HEPARIN SODIUM SCH UNIT: 5000 INJECTION, SOLUTION INTRAVENOUS; SUBCUTANEOUS at 13:52

## 2020-10-29 RX ADMIN — NICOTINE SCH: 21 PATCH, EXTENDED RELEASE TOPICAL at 09:30

## 2020-10-29 RX ADMIN — DOCUSATE SODIUM SCH MG: 100 CAPSULE, LIQUID FILLED ORAL at 09:51

## 2020-10-29 RX ADMIN — IPRATROPIUM BROMIDE AND ALBUTEROL SULFATE PRN ML: 2.5; .5 SOLUTION RESPIRATORY (INHALATION) at 10:02

## 2020-10-29 RX ADMIN — PANTOPRAZOLE SODIUM SCH MG: 40 TABLET, DELAYED RELEASE ORAL at 06:17

## 2020-10-29 RX ADMIN — HEPARIN SODIUM SCH UNIT: 5000 INJECTION, SOLUTION INTRAVENOUS; SUBCUTANEOUS at 22:37

## 2020-10-29 RX ADMIN — CLINDAMYCIN PHOSPHATE SCH MLS/HR: 12 INJECTION, SOLUTION INTRAVENOUS at 22:36

## 2020-10-29 RX ADMIN — Medication SCH MG: at 09:51

## 2020-10-29 RX ADMIN — MULTIVITAMIN TABLET SCH TAB: TABLET at 09:51

## 2020-10-29 NOTE — PDOC PROGRESS REPORT
Subjective


Subjective:: 





SALVADOR FREGOSO III is a 60 year old male with no significant past medical 

history who presents with a 1 week history of progressive chills right-sided 

chest pain shortness of breath.  Patient also states he has been losing weight 

with approximately 15 to 20 pounds lost in the past month.  Patient states is 

never had this sensation before.  He believes it is related to inhaling 

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  He states multiple 

other workers were also ill from this.  He is a current smoker and drinks 4-6 

beers per day last drink was yesterday.  He states he is never gone through 

withdrawal.  He also smokes marijuana intermittently.  He denies any fevers or 

productive cough.  He denies any COVID-19 exposures and denies any history of 

tuberculosis.  He denies any history of GERD or aspiration.  Pulmonology was 

consulted on admission for possible bronchoscopy.  CT scan chest showed nec

rotizing pneumonia in right upper lobe.





10/28/2020


Patient seems to be improved from yesterday and voices that he feels better.  I 

discussed the case with Dr. Bruton and pulmonology today and he stated he would 

like the patient to take antibiotics for a few days and then reassess for 

bronchoscopy needs.  Patient in agreement with this plan.  White blood cells 

lower.  Patient is in a negative pressure room while we rule out TB given he has

a cavitary lesion on CT scan.  Patient denies ever having pneumonia in the past.

 HIV is negative.





10/29/2020


Patient seems to be doing better today than yesterday.  His lungs are clearing 

up on exam.  He is not seem to be experiencing significant withdrawal symptoms. 

He has been quinacrine gold are pending.  Respiratory culture showing GPC in 

pairs on Gram stain.  Pulmonology following.  Patient has no new complaints.


Reason For Visit: 


NECROTIZING PNEUMONIA








Physical Exam


Vital Signs: 


                                        











Temp Pulse Resp BP Pulse Ox


 


 98.2 F   85   18   118/75   97 


 


 10/29/20 17:21  10/29/20 17:21  10/29/20 17:21  10/29/20 17:21  10/29/20 17:21








                                 Intake & Output











 10/28/20 10/29/20 10/30/20





 06:59 06:59 06:59


 


Intake Total 660 1190 590


 


Output Total  335 


 


Balance 660 855 590


 


Weight 54.2 kg 51.7 kg 51.7 kg











Exam: 








General appearance: PRESENT: no acute distress, thin, states he feels well


Head exam: PRESENT: atraumatic, normocephalic


Eye exam: PRESENT: conjunctiva pink.  ABSENT: scleral icterus


Mouth exam: PRESENT: moist


Respiratory exam: PRESENT: Scant rales right upper lobe ABSENT: rhonchi, wheezes


Cardiovascular exam: PRESENT: RRR.  ABSENT: diastolic murmur, rubs, systolic 

murmur


GI/Abdominal exam: PRESENT: normal bowel sounds, soft.  ABSENT: distended, 

guarding, mass, organolmegaly, rebound, tenderness


Neurological exam: PRESENT: alert, awake, oriented to person, oriented to place,

oriented to time, oriented to situation


Psychiatric exam: PRESENT: appropriate affect, normal mood


Skin exam: PRESENT: dry, intact, warm





Results


Laboratory Results: 


                                        





                                 10/29/20 05:56 





                                 10/29/20 05:56 





                                        











  10/29/20 10/29/20





  05:56 05:56


 


WBC  15.1 H 


 


RBC  3.85 L 


 


Hgb  12.3 L 


 


Hct  35.1 L 


 


MCV  91 


 


MCH  31.9 


 


MCHC  35.0 


 


RDW  12.6 


 


Plt Count  423 


 


Seg Neutrophils %  83.2 H 


 


Sodium   129.1 L


 


Potassium   4.5


 


Chloride   95 L


 


Carbon Dioxide   22


 


Anion Gap   12


 


BUN   14


 


Creatinine   0.55


 


Est GFR (African Amer)   > 60


 


Glucose   92


 


Calcium   8.8











Impressions: 


                                        





Chest X-Ray  10/27/20 10:44


IMPRESSION:  DENSE CONSOLIDATION IN THE RIGHT UPPER LOBE LIKELY DUE TO 

PNEUMONIA.  UNDERLYING MASS CANNOT BE EXCLUDED.


 








Chest CT  10/27/20 11:12


IMPRESSION:  Necrotizing right upper lobe pneumonia with central cavity and 

fluid


Probable 1 cm scar in the right lower lobe adjacent to old healed rib fractures


 














Assessment and Plan





- Diagnosis


(1) Necrotizing pneumonia


Is this a current diagnosis for this admission?: Yes   


Plan: 





Approximately 1 week of progressive right-sided chest pain and chills, patient 

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


Pulmonology consulted: Discussed with Dr. Bruton, after being given a few days 

of antibiotics, can reassess for possible bronchoscopy


Cefepime/azithromycin for broad coverage


Rule out TB: QuantiFERON gold and AFB x3


Sputum culture pending, Gram stain growing GPC in pairs


Blood culture


Duo nebs as needed


Pulmonary hygiene


HIV negative








(2) Cavitary lesion of lung


Is this a current diagnosis for this admission?: Yes   


Plan: 





Unclear etiology


Work-up as above








(3) Tobacco abuse


Is this a current diagnosis for this admission?: Yes   





(4) Alcohol abuse


Is this a current diagnosis for this admission?: Yes   


Plan: 





Counseled on cessation


CIWA


As needed Ativan


Thiamine and vitamins








(5) Polysubstance abuse


Is this a current diagnosis for this admission?: Yes   





(6) Alcohol dependence


Qualifiers: 


   Substance use status: in withdrawal   Complication of substance-induced 

condition: uncomplicated   Qualified Code(s): F10.230 - Alcohol dependence with 

withdrawal, uncomplicated   


Is this a current diagnosis for this admission?: Yes   


Plan: 





CIWA








- Time


Time Spent with patient: 25-34 minutes


Medications reviewed and adjusted accordingly: Yes


Anticipated Discharge Disposition: Home, Self Care


Anticipated Discharge Timeframe: within 72 hours





- Inpatient Certification


Based on my medical assessment, after consideration of the patient's 

comorbidities, presenting symptoms, or acuity I expect that the services needed 

warrant INPATIENT care.: Yes


I certify that my determination is in accordance with my understanding of 

Medicare's requirements for reasonable and necessary INPATIENT services [42 CFR 

412.3e].: Yes


Medical Necessity: Significant Comorbidiites Make Outpatient Treatment Too 

Risky, Need Close Monitoring Due to Risk of Patient Decompensation, Need for IV 

Antibiotics, Risk of Complication if Not Cared For in Hospital, Risk of 

Diagnosis Which Will Require Inpatient Eval/Care/Monitoring

## 2020-10-29 NOTE — CDI QUERY
CDI Query


CDI Review: 





We are seeking further clarification of documentation to reflect the severity of

illness of your patient.





Per H&P:


Alcohol abuse


Is this a current diagnosis for this admission?: Yes   


Plan: 


Counseled on cessation


CIWA


As needed Ativan


Thiamine and vitamins





Polysubstance abuse


Is this a current diagnosis for this admission?: Yes  





Based on your medical judgement, can you further clarify in the Progress Notes:





   Alcohol abuse with dependence


   Alcohol abuse without dependence


   Unable to determine 


   Other





   Polysubstance abuse with dependence


   Polysubstance without dependence


   Unable to determine


   Other





Thank you for your consideration.





NICOLÁS Montano RN


Clinical 


Physician Advisor


[phone]


[email or web address]

## 2020-10-30 LAB
ADD MANUAL DIFF: NO
ANION GAP SERPL CALC-SCNC: 12 MMOL/L (ref 5–19)
BASOPHILS # BLD AUTO: 0.1 10^3/UL (ref 0–0.2)
BASOPHILS NFR BLD AUTO: 0.7 % (ref 0–2)
BUN SERPL-MCNC: 13 MG/DL (ref 7–20)
CALCIUM: 8.9 MG/DL (ref 8.4–10.2)
CHLORIDE SERPL-SCNC: 97 MMOL/L (ref 98–107)
CO2 SERPL-SCNC: 24 MMOL/L (ref 22–30)
EOSINOPHIL # BLD AUTO: 0.2 10^3/UL (ref 0–0.6)
EOSINOPHIL NFR BLD AUTO: 2 % (ref 0–6)
ERYTHROCYTE [DISTWIDTH] IN BLOOD BY AUTOMATED COUNT: 12.8 % (ref 11.5–14)
GLUCOSE SERPL-MCNC: 93 MG/DL (ref 75–110)
HCT VFR BLD CALC: 36.2 % (ref 37.9–51)
HGB BLD-MCNC: 12.5 G/DL (ref 13.5–17)
LYMPHOCYTES # BLD AUTO: 1.8 10^3/UL (ref 0.5–4.7)
LYMPHOCYTES NFR BLD AUTO: 14.8 % (ref 13–45)
MCH RBC QN AUTO: 31.6 PG (ref 27–33.4)
MCHC RBC AUTO-ENTMCNC: 34.6 G/DL (ref 32–36)
MCV RBC AUTO: 91 FL (ref 80–97)
MONOCYTES # BLD AUTO: 0.9 10^3/UL (ref 0.1–1.4)
MONOCYTES NFR BLD AUTO: 7.3 % (ref 3–13)
NEUTROPHILS # BLD AUTO: 9 10^3/UL (ref 1.7–8.2)
NEUTS SEG NFR BLD AUTO: 75.2 % (ref 42–78)
PLATELET # BLD: 465 10^3/UL (ref 150–450)
POTASSIUM SERPL-SCNC: 4.4 MMOL/L (ref 3.6–5)
RBC # BLD AUTO: 3.97 10^6/UL (ref 4.35–5.55)
TOTAL CELLS COUNTED % (AUTO): 100 %
WBC # BLD AUTO: 12 10^3/UL (ref 4–10.5)

## 2020-10-30 RX ADMIN — Medication SCH MG: at 10:17

## 2020-10-30 RX ADMIN — CLINDAMYCIN PHOSPHATE SCH MLS/HR: 12 INJECTION, SOLUTION INTRAVENOUS at 14:21

## 2020-10-30 RX ADMIN — MULTIVITAMIN TABLET SCH TAB: TABLET at 10:17

## 2020-10-30 RX ADMIN — ACETAMINOPHEN PRN MG: 325 TABLET ORAL at 06:16

## 2020-10-30 RX ADMIN — NICOTINE SCH: 21 PATCH, EXTENDED RELEASE TOPICAL at 09:33

## 2020-10-30 RX ADMIN — HEPARIN SODIUM SCH UNIT: 5000 INJECTION, SOLUTION INTRAVENOUS; SUBCUTANEOUS at 06:09

## 2020-10-30 RX ADMIN — DOCUSATE SODIUM SCH: 100 CAPSULE, LIQUID FILLED ORAL at 09:33

## 2020-10-30 RX ADMIN — CEFEPIME SCH MLS/HR: 2 INJECTION, POWDER, FOR SOLUTION INTRAMUSCULAR; INTRAVENOUS at 21:40

## 2020-10-30 RX ADMIN — HEPARIN SODIUM SCH UNIT: 5000 INJECTION, SOLUTION INTRAVENOUS; SUBCUTANEOUS at 14:21

## 2020-10-30 RX ADMIN — ACETAMINOPHEN PRN MG: 325 TABLET ORAL at 18:35

## 2020-10-30 RX ADMIN — CEFEPIME SCH MLS/HR: 2 INJECTION, POWDER, FOR SOLUTION INTRAMUSCULAR; INTRAVENOUS at 10:17

## 2020-10-30 RX ADMIN — AZITHROMYCIN MONOHYDRATE SCH MLS/HR: 500 INJECTION, POWDER, LYOPHILIZED, FOR SOLUTION INTRAVENOUS at 11:11

## 2020-10-30 RX ADMIN — CLINDAMYCIN HYDROCHLORIDE SCH MG: 150 CAPSULE ORAL at 21:41

## 2020-10-30 RX ADMIN — HEPARIN SODIUM SCH UNIT: 5000 INJECTION, SOLUTION INTRAVENOUS; SUBCUTANEOUS at 21:40

## 2020-10-30 RX ADMIN — CLINDAMYCIN PHOSPHATE SCH MLS/HR: 12 INJECTION, SOLUTION INTRAVENOUS at 06:09

## 2020-10-30 RX ADMIN — PANTOPRAZOLE SODIUM SCH MG: 40 TABLET, DELAYED RELEASE ORAL at 06:09

## 2020-10-30 NOTE — PDOC PROGRESS REPORT
Subjective


Progress Note for:: 10/30/20


Subjective:: 





No adverse events overnight.  No new complaints.  Vital signs been stable.  He 

is ambulating in the room independently.  He is on room air.  Eating and 

drinking without difficulty.


Reason For Visit: 


NECROTIZING PNEUMONIA








Physical Exam


Vital Signs: 


                                        











Temp Pulse Resp BP Pulse Ox


 


 98.1 F   95   17   119/85   99 


 


 10/30/20 11:56  10/30/20 11:56  10/30/20 11:56  10/30/20 11:56  10/30/20 11:56








                                 Intake & Output











 10/29/20 10/30/20 10/31/20





 06:59 06:59 06:59


 


Intake Total 1190 1280 300


 


Output Total 335  


 


Balance 855 1280 300


 


Weight 51.7 kg 51.6 kg 








General appearance: PRESENT: no acute distress, thin, states he feels well


Head exam: PRESENT: atraumatic, normocephalic


Eye exam: PRESENT: conjunctiva pink.  ABSENT: scleral icterus


Mouth exam: PRESENT: moist


Respiratory exam: PRESENT: Scant rales right upper lobe ABSENT: rhonchi, wheezes


Cardiovascular exam: PRESENT: RRR.  ABSENT: diastolic murmur, rubs, systolic 

murmur


GI/Abdominal exam: PRESENT: normal bowel sounds, soft.  ABSENT: distended, 

guarding, mass, organolmegaly, rebound, tenderness


Neurological exam: PRESENT: alert, awake, oriented to person, oriented to place,

oriented to time, oriented to situation


Psychiatric exam: PRESENT: appropriate affect, normal mood


Skin exam: PRESENT: dry, intact, warm





Results


Laboratory Results: 


                                        





                                 10/30/20 06:34 





                                 10/30/20 06:34 





                                        











  10/30/20 10/30/20





  06:34 06:34


 


WBC  12.0 H 


 


RBC  3.97 L 


 


Hgb  12.5 L 


 


Hct  36.2 L 


 


MCV  91 


 


MCH  31.6 


 


MCHC  34.6 


 


RDW  12.8 


 


Plt Count  465 H 


 


Seg Neutrophils %  75.2 


 


Sodium   132.8 L


 


Potassium   4.4


 


Chloride   97 L


 


Carbon Dioxide   24


 


Anion Gap   12


 


BUN   13


 


Creatinine   0.51 L


 


Est GFR (African Amer)   > 60


 


Glucose   93


 


Calcium   8.9








                                        





10/28/20 09:50   Sputum   Gram Stain - Final








Impressions: 


                                        





Chest X-Ray  10/27/20 10:44


IMPRESSION:  DENSE CONSOLIDATION IN THE RIGHT UPPER LOBE LIKELY DUE TO 

PNEUMONIA.  UNDERLYING MASS CANNOT BE EXCLUDED.


 








Chest CT  10/27/20 11:12


IMPRESSION:  Necrotizing right upper lobe pneumonia with central cavity and 

fluid


Probable 1 cm scar in the right lower lobe adjacent to old healed rib fractures


 














Assessment and Plan





- Diagnosis


(1) Necrotizing pneumonia


Is this a current diagnosis for this admission?: Yes   





(2) Cavitary lesion of lung


Is this a current diagnosis for this admission?: Yes   





(3) Alcohol dependence


Qualifiers: 


   Substance use status: in withdrawal   Complication of substance-induced 

condition: uncomplicated   Qualified Code(s): F10.230 - Alcohol dependence with 

withdrawal, uncomplicated   


Is this a current diagnosis for this admission?: Yes   





(4) Polysubstance abuse


Is this a current diagnosis for this admission?: Yes   





(5) Tobacco abuse


Is this a current diagnosis for this admission?: Yes   





- Plan Summary


Summary: 


Seems to be responding to antibiotics.  White blood cell count is coming down.  

Clinically he appears pretty well.  First AFB smear was negative.  The other 2 

are pending.  QuantiFERON gold pending.  We will continue current antibiotic 

therapy.  If he is ruled out for TB, will repeat CT scan to compare with the 

initial study and assess for interval change.  If it has improved, will arrange 

outpatient follow-up with pulmonology.  If it has shown no change, will follow-

up with pulmonology to see if he needs further inpatient evaluation.  He shows 

no signs of alcohol withdrawal at this time.





- Time


Time Spent with patient: 15-24 minutes


Anticipated Discharge Disposition: Home, Self Care


Anticipated Discharge Timeframe: within 72 hours

## 2020-10-31 LAB
ADD MANUAL DIFF: NO
ANION GAP SERPL CALC-SCNC: 8 MMOL/L (ref 5–19)
BASOPHILS # BLD AUTO: 0.2 10^3/UL (ref 0–0.2)
BASOPHILS NFR BLD AUTO: 1.2 % (ref 0–2)
BUN SERPL-MCNC: 13 MG/DL (ref 7–20)
CALCIUM: 8.6 MG/DL (ref 8.4–10.2)
CHLORIDE SERPL-SCNC: 98 MMOL/L (ref 98–107)
CO2 SERPL-SCNC: 24 MMOL/L (ref 22–30)
EOSINOPHIL # BLD AUTO: 0.5 10^3/UL (ref 0–0.6)
EOSINOPHIL NFR BLD AUTO: 3.7 % (ref 0–6)
ERYTHROCYTE [DISTWIDTH] IN BLOOD BY AUTOMATED COUNT: 12.6 % (ref 11.5–14)
GLUCOSE SERPL-MCNC: 94 MG/DL (ref 75–110)
HCT VFR BLD CALC: 33 % (ref 37.9–51)
HGB BLD-MCNC: 11.4 G/DL (ref 13.5–17)
LYMPHOCYTES # BLD AUTO: 1.6 10^3/UL (ref 0.5–4.7)
LYMPHOCYTES NFR BLD AUTO: 12.7 % (ref 13–45)
MCH RBC QN AUTO: 31.6 PG (ref 27–33.4)
MCHC RBC AUTO-ENTMCNC: 34.5 G/DL (ref 32–36)
MCV RBC AUTO: 92 FL (ref 80–97)
MONOCYTES # BLD AUTO: 0.9 10^3/UL (ref 0.1–1.4)
MONOCYTES NFR BLD AUTO: 7 % (ref 3–13)
NEUTROPHILS # BLD AUTO: 9.8 10^3/UL (ref 1.7–8.2)
NEUTS SEG NFR BLD AUTO: 75.4 % (ref 42–78)
PLATELET # BLD: 491 10^3/UL (ref 150–450)
POTASSIUM SERPL-SCNC: 4.2 MMOL/L (ref 3.6–5)
RBC # BLD AUTO: 3.61 10^6/UL (ref 4.35–5.55)
TOTAL CELLS COUNTED % (AUTO): 100 %
WBC # BLD AUTO: 13 10^3/UL (ref 4–10.5)

## 2020-10-31 RX ADMIN — CLINDAMYCIN HYDROCHLORIDE SCH MG: 150 CAPSULE ORAL at 21:36

## 2020-10-31 RX ADMIN — MULTIVITAMIN TABLET SCH TAB: TABLET at 11:18

## 2020-10-31 RX ADMIN — Medication SCH MG: at 11:18

## 2020-10-31 RX ADMIN — CLINDAMYCIN HYDROCHLORIDE SCH MG: 150 CAPSULE ORAL at 15:06

## 2020-10-31 RX ADMIN — HEPARIN SODIUM SCH UNIT: 5000 INJECTION, SOLUTION INTRAVENOUS; SUBCUTANEOUS at 15:06

## 2020-10-31 RX ADMIN — ACETAMINOPHEN PRN MG: 325 TABLET ORAL at 05:02

## 2020-10-31 RX ADMIN — NICOTINE SCH: 21 PATCH, EXTENDED RELEASE TOPICAL at 11:19

## 2020-10-31 RX ADMIN — HEPARIN SODIUM SCH UNIT: 5000 INJECTION, SOLUTION INTRAVENOUS; SUBCUTANEOUS at 05:02

## 2020-10-31 RX ADMIN — ACETAMINOPHEN PRN MG: 325 TABLET ORAL at 16:18

## 2020-10-31 RX ADMIN — CEFEPIME SCH MLS/HR: 2 INJECTION, POWDER, FOR SOLUTION INTRAMUSCULAR; INTRAVENOUS at 11:18

## 2020-10-31 RX ADMIN — DOCUSATE SODIUM SCH: 100 CAPSULE, LIQUID FILLED ORAL at 11:17

## 2020-10-31 RX ADMIN — HEPARIN SODIUM SCH UNIT: 5000 INJECTION, SOLUTION INTRAVENOUS; SUBCUTANEOUS at 21:36

## 2020-10-31 RX ADMIN — CEFEPIME SCH MLS/HR: 2 INJECTION, POWDER, FOR SOLUTION INTRAMUSCULAR; INTRAVENOUS at 21:36

## 2020-10-31 RX ADMIN — PANTOPRAZOLE SODIUM SCH MG: 40 TABLET, DELAYED RELEASE ORAL at 05:02

## 2020-10-31 RX ADMIN — CLINDAMYCIN HYDROCHLORIDE SCH MG: 150 CAPSULE ORAL at 05:02

## 2020-10-31 RX ADMIN — AZITHROMYCIN SCH MG: 250 TABLET, FILM COATED ORAL at 11:18

## 2020-10-31 RX ADMIN — ACETAMINOPHEN PRN MG: 325 TABLET ORAL at 22:44

## 2020-10-31 NOTE — PDOC PROGRESS REPORT
Subjective


Progress Note for:: 10/31/20


Subjective:: 





No adverse events overnight.  No new complaints.  Vital signs been stable.  He 

is ambulating in the room independently.  He is on room air.  Eating and 

drinking without difficulty.  He says he feels pretty good.


Reason For Visit: 


NECROTIZING PNEUMONIA








Physical Exam


Vital Signs: 


                                        











Temp Pulse Resp BP Pulse Ox


 


 97.7 F   63   18   128/74 H  100 


 


 10/31/20 11:26  10/31/20 11:26  10/31/20 11:26  10/31/20 11:26  10/31/20 11:26








                                 Intake & Output











 10/30/20 10/31/20 11/01/20





 06:59 06:59 05:59


 


Intake Total 1280 1070 


 


Output Total  500 


 


Balance 1280 570 


 


Weight 51.6 kg 52 kg 








General appearance: PRESENT: no acute distress, thin, states he feels well


Head exam: PRESENT: atraumatic, normocephalic


Eye exam: PRESENT: conjunctiva pink.  ABSENT: scleral icterus


Mouth exam: PRESENT: moist


Respiratory exam: PRESENT: Scant rales right upper lobe ABSENT: rhonchi, wheezes


Cardiovascular exam: PRESENT: RRR.  ABSENT: diastolic murmur, rubs, systolic 

murmur


GI/Abdominal exam: PRESENT: normal bowel sounds, soft.  ABSENT: distended, 

guarding, mass, organolmegaly, rebound, tenderness


Neurological exam: PRESENT: alert, awake, oriented to person, oriented to place,

oriented to time, oriented to situation


Psychiatric exam: PRESENT: appropriate affect, normal mood


Skin exam: PRESENT: dry, intact, warm





Results


Laboratory Results: 


                                        





                                 10/31/20 06:06 





                                 10/31/20 06:06 





                                        











  10/31/20 10/31/20





  06:06 06:06


 


WBC  13.0 H 


 


RBC  3.61 L 


 


Hgb  11.4 L 


 


Hct  33.0 L 


 


MCV  92 


 


MCH  31.6 


 


MCHC  34.5 


 


RDW  12.6 


 


Plt Count  491 H 


 


Seg Neutrophils %  75.4 


 


Sodium   129.7 L


 


Potassium   4.2


 


Chloride   98


 


Carbon Dioxide   24


 


Anion Gap   8


 


BUN   13


 


Creatinine   0.49 L


 


Est GFR (African Amer)   > 60


 


Glucose   94


 


Calcium   8.6








                                        





10/28/20 09:50   Sputum   AFB Smear Concentration - Final


10/28/20 09:50   Sputum   Acid Fast Bacilli Smear - Final


10/28/20 09:50   Sputum   Gram Stain - Final


10/28/20 09:50   Sputum   Sputum Culture - Final


                            Group F Beta Streptococcus


                            Normal April








Impressions: 


                                        





Chest X-Ray  10/27/20 10:44


IMPRESSION:  DENSE CONSOLIDATION IN THE RIGHT UPPER LOBE LIKELY DUE TO PNEUMONI

A.  UNDERLYING MASS CANNOT BE EXCLUDED.


 








Chest CT  10/27/20 11:12


IMPRESSION:  Necrotizing right upper lobe pneumonia with central cavity and 

fluid


Probable 1 cm scar in the right lower lobe adjacent to old healed rib fractures


 














Assessment and Plan





- Diagnosis


(1) Necrotizing pneumonia


Is this a current diagnosis for this admission?: Yes   





(2) Cavitary lesion of lung


Is this a current diagnosis for this admission?: Yes   





(3) Alcohol dependence


Qualifiers: 


   Substance use status: in withdrawal   Complication of substance-induced 

condition: uncomplicated   Qualified Code(s): F10.230 - Alcohol dependence with 

withdrawal, uncomplicated   


Is this a current diagnosis for this admission?: Yes   





(4) Polysubstance abuse


Is this a current diagnosis for this admission?: Yes   





(5) Tobacco abuse


Is this a current diagnosis for this admission?: Yes   





- Plan Summary


Summary: 


Seems to be responding to antibiotics.  White blood cell count is coming down.  

Clinically he appears pretty well.  First AFB smear was negative.  The other 2 

are pending, the one collected on the 29th has not resulted for some reason.  

QuantiFERON gold pending.  We will continue current antibiotic therapy.  If he 

is ruled out for TB, will repeat CT scan to compare with the initial study and 

assess for interval change.  If it has improved, will arrange outpatient follow-

up with pulmonology.  If it has shown no change, will follow-up with pulmonology

to see if he needs further inpatient evaluation.  He shows no signs of alcohol 

withdrawal at this time.





- Time


Time Spent with patient: 15-24 minutes


Anticipated Discharge Disposition: Home, Self Care


Anticipated Discharge Timeframe: within 72 hours

## 2020-11-01 RX ADMIN — AZITHROMYCIN SCH MG: 250 TABLET, FILM COATED ORAL at 11:11

## 2020-11-01 RX ADMIN — PANTOPRAZOLE SODIUM SCH MG: 40 TABLET, DELAYED RELEASE ORAL at 05:51

## 2020-11-01 RX ADMIN — HEPARIN SODIUM SCH UNIT: 5000 INJECTION, SOLUTION INTRAVENOUS; SUBCUTANEOUS at 21:04

## 2020-11-01 RX ADMIN — DOCUSATE SODIUM SCH: 100 CAPSULE, LIQUID FILLED ORAL at 11:10

## 2020-11-01 RX ADMIN — CLINDAMYCIN HYDROCHLORIDE SCH MG: 150 CAPSULE ORAL at 21:04

## 2020-11-01 RX ADMIN — HEPARIN SODIUM SCH UNIT: 5000 INJECTION, SOLUTION INTRAVENOUS; SUBCUTANEOUS at 13:50

## 2020-11-01 RX ADMIN — Medication SCH MG: at 11:11

## 2020-11-01 RX ADMIN — CEFEPIME SCH MLS/HR: 2 INJECTION, POWDER, FOR SOLUTION INTRAMUSCULAR; INTRAVENOUS at 21:04

## 2020-11-01 RX ADMIN — ACETAMINOPHEN PRN MG: 325 TABLET ORAL at 14:03

## 2020-11-01 RX ADMIN — NICOTINE SCH: 21 PATCH, EXTENDED RELEASE TOPICAL at 11:10

## 2020-11-01 RX ADMIN — CLINDAMYCIN HYDROCHLORIDE SCH MG: 150 CAPSULE ORAL at 13:50

## 2020-11-01 RX ADMIN — CEFEPIME SCH MLS/HR: 2 INJECTION, POWDER, FOR SOLUTION INTRAMUSCULAR; INTRAVENOUS at 11:12

## 2020-11-01 RX ADMIN — ACETAMINOPHEN PRN MG: 325 TABLET ORAL at 20:13

## 2020-11-01 RX ADMIN — ACETAMINOPHEN PRN MG: 325 TABLET ORAL at 05:55

## 2020-11-01 RX ADMIN — MULTIVITAMIN TABLET SCH TAB: TABLET at 11:11

## 2020-11-01 RX ADMIN — CLINDAMYCIN HYDROCHLORIDE SCH MG: 150 CAPSULE ORAL at 05:52

## 2020-11-01 RX ADMIN — HEPARIN SODIUM SCH UNIT: 5000 INJECTION, SOLUTION INTRAVENOUS; SUBCUTANEOUS at 05:52

## 2020-11-01 NOTE — PDOC PROGRESS REPORT
Subjective


Progress Note for:: 11/01/20


Subjective:: 





No adverse events overnight.  No new complaints.  Vital signs been stable.  He 

is ambulating in the room independently.  He is on room air.  Eating and 

drinking without difficulty.  He says he feels pretty good.  Clinical condition 

is unchanged.


Reason For Visit: 


NECROTIZING PNEUMONIA








Physical Exam


Vital Signs: 


                                        











Temp Pulse Resp BP Pulse Ox


 


 97.5 F   73   16   116/73   99 


 


 11/01/20 11:50  11/01/20 14:00  11/01/20 11:50  11/01/20 11:50  11/01/20 11:50








                                 Intake & Output











 10/31/20 11/01/20 11/02/20





 07:59 06:59 06:59


 


Intake Total   


 


Output Total   


 


Balance   


 


Weight   








General appearance: PRESENT: no acute distress, thin, states he feels well


Head exam: PRESENT: atraumatic, normocephalic


Eye exam: PRESENT: conjunctiva pink.  ABSENT: scleral icterus


Mouth exam: PRESENT: moist


Respiratory exam: PRESENT: Scant rales right upper lobe ABSENT: rhonchi, wheezes


Cardiovascular exam: PRESENT: RRR.  ABSENT: diastolic murmur, rubs, systolic 

murmur


GI/Abdominal exam: PRESENT: normal bowel sounds, soft.  ABSENT: distended, 

guarding, mass, organolmegaly, rebound, tenderness


Neurological exam: PRESENT: alert, awake, oriented to person, oriented to place,

oriented to time, oriented to situation


Psychiatric exam: PRESENT: appropriate affect, normal mood


Skin exam: PRESENT: dry, intact, warm





Results


Laboratory Results: 


                                        





                                 10/31/20 06:06 





                                 10/31/20 06:06 





                                        





10/27/20 15:09   Blood   Blood Culture - Final


                            NO GROWTH IN 5 DAYS


10/27/20 12:42   Blood   Blood Culture - Final


                            NO GROWTH IN 5 DAYS








Impressions: 


                                        





Chest X-Ray  10/27/20 10:44


IMPRESSION:  DENSE CONSOLIDATION IN THE RIGHT UPPER LOBE LIKELY DUE TO 

PNEUMONIA.  UNDERLYING MASS CANNOT BE EXCLUDED.


 








Chest CT  10/27/20 11:12


IMPRESSION:  Necrotizing right upper lobe pneumonia with central cavity and 

fluid


Probable 1 cm scar in the right lower lobe adjacent to old healed rib fractures


 














Assessment and Plan





- Diagnosis


(1) Necrotizing pneumonia


Is this a current diagnosis for this admission?: Yes   





(2) Cavitary lesion of lung


Is this a current diagnosis for this admission?: Yes   





(3) Alcohol dependence


Qualifiers: 


   Substance use status: in withdrawal   Complication of substance-induced 

condition: uncomplicated   Qualified Code(s): F10.230 - Alcohol dependence with 

withdrawal, uncomplicated   


Is this a current diagnosis for this admission?: Yes   





(4) Polysubstance abuse


Is this a current diagnosis for this admission?: Yes   





(5) Tobacco abuse


Is this a current diagnosis for this admission?: Yes   





- Plan Summary


Summary: 


Clinically he appears pretty well.  First AFB smear was negative.  The other 2 

are pending, the one collected on the 29th has not resulted for some reason.  

QuantiFERON gold pending.  We will continue current antibiotic therapy.  If he 

is ruled out for TB, will repeat CT scan to compare with the initial study and 

assess for interval change.  If it has improved, will arrange outpatient follow-

up with pulmonology.  If it has shown no change, will follow-up with pulmonology

to see if he needs further inpatient evaluation.  He shows no signs of alcohol 

withdrawal at this time.





- Time


Anticipated Discharge Disposition: Home, Self Care


Anticipated Discharge Timeframe: within 72 hours

## 2020-11-02 VITALS — SYSTOLIC BLOOD PRESSURE: 132 MMHG | DIASTOLIC BLOOD PRESSURE: 77 MMHG

## 2020-11-02 RX ADMIN — NICOTINE SCH: 21 PATCH, EXTENDED RELEASE TOPICAL at 09:07

## 2020-11-02 RX ADMIN — PANTOPRAZOLE SODIUM SCH MG: 40 TABLET, DELAYED RELEASE ORAL at 05:31

## 2020-11-02 RX ADMIN — CEFEPIME SCH MLS/HR: 2 INJECTION, POWDER, FOR SOLUTION INTRAMUSCULAR; INTRAVENOUS at 09:13

## 2020-11-02 RX ADMIN — AZITHROMYCIN SCH MG: 250 TABLET, FILM COATED ORAL at 09:57

## 2020-11-02 RX ADMIN — HEPARIN SODIUM SCH UNIT: 5000 INJECTION, SOLUTION INTRAVENOUS; SUBCUTANEOUS at 05:31

## 2020-11-02 RX ADMIN — CLINDAMYCIN HYDROCHLORIDE SCH MG: 150 CAPSULE ORAL at 05:31

## 2020-11-02 RX ADMIN — DOCUSATE SODIUM SCH: 100 CAPSULE, LIQUID FILLED ORAL at 09:07

## 2020-11-02 RX ADMIN — Medication SCH MG: at 09:13

## 2020-11-02 RX ADMIN — MULTIVITAMIN TABLET SCH TAB: TABLET at 09:13

## 2020-11-02 NOTE — RADIOLOGY REPORT (SQ)
EXAM DESCRIPTION:  CT CHEST WITH



IMAGES COMPLETED DATE/TIME:  11/2/2020 11:55 am



REASON FOR STUDY:  pneumonia



COMPARISON:  10/27/2020



TECHNIQUE:  CT scan of the chest performed using helical scanning technique with dynamic intravenous 
contrast injection.  Images reviewed with lung, soft tissue and bone windows.  Reconstructed coronal 
and sagittal MPR and MIP images reviewed.  All images stored on PACS.

All CT scanners at this facility use dose modulation, iterative reconstruction, and/or weight based d
osing when appropriate to reduce radiation dose to as low as reasonably achievable (ALARA).

[facility]: Dose Right  [facility]: CareDose    [facility]: Dose Right    [facility]: Teradose 4D    [facility]: Smart Technologies



CONTRAST TYPE AND DOSE:  contrast/concentration: Isovue 350.00 mmol/ml; Total Contrast Delivered: 79.
9 ml; Total Saline Delivered: 35.9 ml



RENAL FUNCTION:  Creatinine 0.49



RADIATION DOSE:  CT Rad equipment meets quality standard of care and radiation dose reduction techniq
ues were employed. CTDIvol: 5.1 mGy. DLP: 212 mGy-cm. .



LIMITATIONS:  None.



FINDINGS:  LUNGS AND PLEURA: Persistent dense consolidation within the right upper lobe with areas of
 central cavitation, grossly stable compared to prior.  Patchy additional areas of ground-glass atten
uation throughout the right upper lobe, likely postobstructive atelectatic change or additional pneum
onia.  Unchanged 1 cm scar within the peripheral right lower lobe (series 4, image 38).  No significa
nt pleural effusion.  No pneumothorax.

HILAR AND MEDIASTINAL STRUCTURES: Similar appearance of the right suprahilar and paratracheal adenopa
thy.  For reference, largest pretracheal node measures approximately 15 x 11 mm (series 3, image 23).


HEART AND VASCULAR STRUCTURES: Scattered coronary atherosclerosis.  No aneurysm or dissection.  No ce
ntral pulmonary emboli.  No pericardial effusion.

HARDWARE: None in the chest.

UPPER ABDOMEN: No significant findings.  Limited exam.

THYROID AND OTHER SOFT TISSUES: No masses.  No adenopathy.

BONES: No significant finding.

OTHER: No other significant finding.



IMPRESSION:  1.  Grossly stable appearance of the right upper lobe cavitary consolidation suggestive 
of cavitary pneumonia.  Recommend follow-up to resolution to ensure no underlying mass lesion.  Stabl
e pretracheal adenopathy.

2.  Mild new right upper lobe ground-glass opacities, likely additional postobstructive atelectasis/p
neumonia.



TECHNICAL DOCUMENTATION:  JOB ID:  [id number]

Quality ID # [id number]: Final reports with documentation of one or more dose reduction techniques (e.g., Au
tomated exposure control, adjustment of the mA and/or kV according to patient size, use of iterative 
reconstruction technique)

 2011 [employer]- All Rights Reserved



Reading location - IP[facility]workstation name: [email or web address]

## 2020-11-02 NOTE — PDOC DISCHARGE SUMMARY
Impression





- Admit/DC Date/PCP


Admission Date/Primary Care Provider: 


  10/27/20 16:28





  





Discharge Date: 11/02/20





- Discharge Diagnosis


(1) Necrotizing pneumonia


Is this a current diagnosis for this admission?: Yes   





(2) Cavitary lesion of lung


Is this a current diagnosis for this admission?: Yes   





(3) Alcohol dependence


Is this a current diagnosis for this admission?: Yes   





(4) Polysubstance abuse


Is this a current diagnosis for this admission?: Yes   





(5) Tobacco abuse


Is this a current diagnosis for this admission?: Yes   





- Assessment


Summary: 


Clinically he appears pretty well.  First AFB smear was negative.  The other 2 

are pending, the one collected on the 29th has not resulted for some reason.  

QuantiFERON gold pending.  We will continue current antibiotic therapy.  If he 

is ruled out for TB, will repeat CT scan to compare with the initial study and 

assess for interval change.  If it has improved, will arrange outpatient follow-

up with pulmonology.  If it has shown no change, will follow-up with pulmonology

to see if he needs further inpatient evaluation.  He shows no signs of alcohol 

withdrawal at this time.





- Additional Information


Resuscitation Status: Full Code


Discharge Diet: Regular


Discharge Activity: Activity As Tolerated, Balance Activity w/Rest


Referrals: 


[facility] [Provider Group] - 11/05/20 11:00 am


BRUTON,HENRY DAVID, MD [ACTIVE PROVISIONAL STAFF] -  (7-10 days)


Prescriptions: 


Levofloxacin [Levaquin 750 mg Tablet] 750 mg PO DAILY #14 tab


Home Medications: 








Levofloxacin [Levaquin 750 mg Tablet] 750 mg PO DAILY #14 tab 11/02/20 











History of Present Illiness


History of Present Illness: 


SALVADOR FREGOSO III is a 60 year old male with no significant past medical 

history who presents with a 1 week history of progressive chills right-sided 

chest pain shortness of breath.  Patient also states he has been losing weight 

with approximately 15 to 20 pounds lost in the past month.  Patient states is 

never had this sensation before.  He believes it is related to inhaling 

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  He states multiple 

other workers were also ill from this.  He is a current smoker and drinks 4-6 

beers per day last drink was yesterday.  He states he is never gone through 

withdrawal.  He also smokes marijuana intermittently.  He denies any fevers or 

productive cough.  He denies any COVID-19 exposures and denies any history of 

tuberculosis.  He denies any history of GERD or aspiration.  Pulmonology was 

consulted on admission for possible bronchoscopy.  CT scan chest showed 

necrotizing pneumonia in right upper lobe.








Hospital Course


Hospital Course: 


We empirically had him on antibiotics and get a pulmonology consultation.  Plan 

was to rule him out for TB, keep him on empiric antibiotics, monitor his 

cultures and follow his clinical course.  He had a negative QuantiFERON gold and

we had a negative AFB smear but were unable to collect more than 1 because he 

could not produce enough sputum for the other 2.  Clinically he appeared very 

stable.  He was ambulating on room air with normal vital signs and his white 

blood cell count came down.  We repeated the CT scan and it was unchanged 

essentially.  He has a right upper lobe cavitary pneumonia.  Dr. Bruton 

recommended 14 days of levofloxacin and will see the patient in his office next 

week.  His labs and examination were reassuring and he was discharged in stable 

condition.  He was strongly encouraged to quit smoking.  He never showed any 

signs of alcohol withdrawal.





Physical Exam


Vital Signs: 


                                        











Temp Pulse Resp BP Pulse Ox


 


 97.5 F   75   17   132/77 H  99 


 


 11/02/20 15:37  11/02/20 15:37  11/02/20 15:37  11/02/20 15:37  11/02/20 15:37








                                 Intake & Output











 11/01/20 11/02/20 11/03/20





 06:59 06:59 06:59


 


Intake Total  1317 


 


Balance  1317 


 


Weight  53.6 kg 








General appearance: PRESENT: no acute distress, thin, states he feels well


Head exam: PRESENT: atraumatic, normocephalic


Eye exam: PRESENT: conjunctiva pink.  ABSENT: scleral icterus


Mouth exam: PRESENT: moist


Respiratory exam: PRESENT: Scant rales right upper lobe ABSENT: rhonchi, wheezes


Cardiovascular exam: PRESENT: RRR.  ABSENT: diastolic murmur, rubs, systolic 

murmur


GI/Abdominal exam: PRESENT: normal bowel sounds, soft.  ABSENT: distended, 

guarding, mass, organolmegaly, rebound, tenderness


Neurological exam: PRESENT: alert, awake, oriented to person, oriented to place,

oriented to time, oriented to situation


Psychiatric exam: PRESENT: appropriate affect, normal mood


Skin exam: PRESENT: dry, intact, warm





Results


Laboratory Results: 


                                        











WBC  13.0 10^3/uL (4.0-10.5)  H  10/31/20  06:06    


 


RBC  3.61 10^6/uL (4.35-5.55)  L  10/31/20  06:06    


 


Hgb  11.4 g/dL (13.5-17.0)  L  10/31/20  06:06    


 


Hct  33.0 % (37.9-51.0)  L  10/31/20  06:06    


 


MCV  92 fl (80-97)   10/31/20  06:06    


 


MCH  31.6 pg (27.0-33.4)   10/31/20  06:06    


 


MCHC  34.5 g/dL (32.0-36.0)   10/31/20  06:06    


 


RDW  12.6 % (11.5-14.0)   10/31/20  06:06    


 


Plt Count  491 10^3/uL (150-450)  H  10/31/20  06:06    


 


Lymph % (Auto)  12.7 % (13-45)  L  10/31/20  06:06    


 


Mono % (Auto)  7.0 % (3-13)   10/31/20  06:06    


 


Eos % (Auto)  3.7 % (0-6)   10/31/20  06:06    


 


Baso % (Auto)  1.2 % (0-2)   10/31/20  06:06    


 


Absolute Neuts (auto)  9.8 10^3/uL (1.7-8.2)  H  10/31/20  06:06    


 


Absolute Lymphs (auto)  1.6 10^3/uL (0.5-4.7)   10/31/20  06:06    


 


Absolute Monos (auto)  0.9 10^3/uL (0.1-1.4)   10/31/20  06:06    


 


Absolute Eos (auto)  0.5 10^3/uL (0.0-0.6)   10/31/20  06:06    


 


Absolute Basos (auto)  0.2 10^3/uL (0.0-0.2)   10/31/20  06:06    


 


Seg Neutrophils %  75.4 % (42-78)   10/31/20  06:06    


 


Sodium  129.7 mmol/L (137-145)  L  10/31/20  06:06    


 


Potassium  4.2 mmol/L (3.6-5.0)   10/31/20  06:06    


 


Chloride  98 mmol/L ()   10/31/20  06:06    


 


Carbon Dioxide  24 mmol/L (22-30)   10/31/20  06:06    


 


Anion Gap  8  (5-19)   10/31/20  06:06    


 


BUN  13 mg/dL (7-20)   10/31/20  06:06    


 


Creatinine  0.49 mg/dL (0.52-1.25)  L  10/31/20  06:06    


 


Est GFR ( Amer)  > 60  (>60)   10/31/20  06:06    


 


Est GFR (MDRD) Non-Af  > 60  (>60)   10/31/20  06:06    


 


Glucose  94 mg/dL ()   10/31/20  06:06    


 


Calcium  8.6 mg/dL (8.4-10.2)   10/31/20  06:06    


 


Phosphorus  3.4 mg/dL (2.5-4.5)   10/28/20  07:06    


 


Magnesium  1.9 mg/dL (1.6-2.3)   10/28/20  07:06    


 


Total Bilirubin  0.8 mg/dL (0.2-1.3)   10/27/20  12:42    


 


Direct Bilirubin  0.3 mg/dL (0.0-0.4)   10/27/20  12:42    


 


Neonat Total Bilirubin  Not Reportable   10/27/20  12:42    


 


Neonat Direct Bilirubin  Not Reportable   10/27/20  12:42    


 


Neonat Indirect Bili  Not Reportable   10/27/20  12:42    


 


AST  48 U/L (17-59)   10/27/20  12:42    


 


ALT  27 U/L (<50)   10/27/20  12:42    


 


Alkaline Phosphatase  92 U/L ()   10/27/20  12:42    


 


Total Protein  7.5 g/dL (6.3-8.2)   10/27/20  12:42    


 


Albumin  3.4 g/dL (3.5-5.0)  L  10/27/20  12:42    


 


Triglycerides  156 mg/dL (<150)  H  10/28/20  07:06    


 


Cholesterol  97.18 mg/dL (0-200)   10/28/20  07:06    


 


LDL Cholesterol Direct  40 mg/dL (<100)   10/28/20  07:06    


 


VLDL Cholesterol  31.2 mg/dL (10-31)  H  10/28/20  07:06    


 


HDL Cholesterol  21 mg/dL (>40)  L  10/28/20  07:06    


 


Serum Alcohol  < 10 mg/dL (NONE DETECTED)   10/27/20  12:42    


 


HIV 1&2 Antibody  NEGATIVE  (NEGATIVE)   10/28/20  07:06    


 


TB Test (QFT) Gold Plus  Negative  (Negative)   10/28/20  07:06    


 


TB Test (QFT) Nil  0.02 IU/mL (.)   10/28/20  07:06    


 


TB Test (QFT) Mitogen  1.23 IU/mL (.)   10/28/20  07:06    


 


TB Test (QFT) Ag 1  0.03 IU/mL (.)   10/28/20  07:06    


 


TB Test (QFT) Ag 2  0.02 IU/mL (.)   10/28/20  07:06    


 


TB Test (QFT) Criteria  Comment  (.)   10/28/20  07:06    


 


AFB Smear  NO ACID FAST BACILLI  (NO AFB SEEN)   10/28/20  09:50    











Impressions: 


                                        





Chest X-Ray  10/27/20 10:44


IMPRESSION:  DENSE CONSOLIDATION IN THE RIGHT UPPER LOBE LIKELY DUE TO 

PNEUMONIA.  UNDERLYING MASS CANNOT BE EXCLUDED.


 








Chest CT  10/27/20 11:12


IMPRESSION:  Necrotizing right upper lobe pneumonia with central cavity and 

fluid


Probable 1 cm scar in the right lower lobe adjacent to old healed rib fractures


 








Chest CT  11/02/20 00:00


IMPRESSION:  1.  Grossly stable appearance of the right upper lobe cavitary 

consolidation suggestive of cavitary pneumonia.  Recommend follow-up to 

resolution to ensure no underlying mass lesion.  Stable pretracheal adenopathy.


2.  Mild new right upper lobe ground-glass opacities, likely additional 

postobstructive atelectasis/pneumonia.


 














Plan


Time Spent: Greater than 30 Minutes





Stroke


Is this a Stroke Patient?: No





Acute Heart Failure


Is this a Heart Failure Patient?: No